# Patient Record
Sex: MALE | Race: OTHER | HISPANIC OR LATINO
[De-identification: names, ages, dates, MRNs, and addresses within clinical notes are randomized per-mention and may not be internally consistent; named-entity substitution may affect disease eponyms.]

---

## 2021-10-28 ENCOUNTER — APPOINTMENT (OUTPATIENT)
Dept: UROLOGY | Facility: CLINIC | Age: 56
End: 2021-10-28
Payer: COMMERCIAL

## 2021-10-28 VITALS
TEMPERATURE: 97.8 F | BODY MASS INDEX: 28.85 KG/M2 | HEART RATE: 98 BPM | HEIGHT: 64 IN | WEIGHT: 169 LBS | RESPIRATION RATE: 15 BRPM | DIASTOLIC BLOOD PRESSURE: 78 MMHG | SYSTOLIC BLOOD PRESSURE: 115 MMHG | OXYGEN SATURATION: 96 %

## 2021-10-28 DIAGNOSIS — R35.1 NOCTURIA: ICD-10-CM

## 2021-10-28 PROCEDURE — 99203 OFFICE O/P NEW LOW 30 MIN: CPT

## 2021-10-28 NOTE — ASSESSMENT
[FreeTextEntry1] : Nocturia may have improved.  He does not have significant daytime symptoms.  Prostate is small on examination and the residual urine volume is minimal.  Urine studies will be sent.  He will continue to manage conservatively and let me know if symptoms recur or worsen.  We will try to obtain his laboratory results from his annual physical.\par \par Addison Britt MD, FACS\par [facility]\par  of Urology\par \par [address], [address]\[place] [address]\par \par [address], [address]\[place] [address]\par \par Tel: [phone]\par Fax: [phone]

## 2021-10-28 NOTE — PHYSICAL EXAM
[General Appearance - Well Developed] : well developed [General Appearance - Well Nourished] : well nourished [Normal Appearance] : normal appearance [Well Groomed] : well groomed [General Appearance - In No Acute Distress] : no acute distress [Edema] : no peripheral edema [Respiration, Rhythm And Depth] : normal respiratory rhythm and effort [Exaggerated Use Of Accessory Muscles For Inspiration] : no accessory muscle use [Abdomen Soft] : soft [Abdomen Tenderness] : non-tender [Costovertebral Angle Tenderness] : no ~M costovertebral angle tenderness [Urethral Meatus] : meatus normal [Penis Abnormality] : normal circumcised penis [Urinary Bladder Findings] : the bladder was normal on palpation [Scrotum] : the scrotum was normal [Testes Tenderness] : no tenderness of the testes [Testes Mass (___cm)] : there were no testicular masses [Prostate Enlargement] : the prostate was not enlarged [Prostate Tenderness] : the prostate was not tender [No Prostate Nodules] : no prostate nodules [Normal Station and Gait] : the gait and station were normal for the patient's age [] : no rash [No Focal Deficits] : no focal deficits [Affect] : the affect was normal [Oriented To Time, Place, And Person] : oriented to person, place, and time [Mood] : the mood was normal [Not Anxious] : not anxious [Inguinal Lymph Nodes Enlarged Bilaterally] : inguinal

## 2021-10-28 NOTE — HISTORY OF PRESENT ILLNESS
[FreeTextEntry1] : He is a 56-year-old man who is seen today for initial visit.  In the last few weeks, he was having nocturia up to 3 times.  Generally he does not have significant urinary symptoms.  He does not have urinary frequency during the day.  Also recently he switched his work schedule from daytime to nighttime.  He believes that urination has improved but has not had a chance to assess it yet because now he sleeps in the daytime when he gets home.  Residual urine volume was less than 50 cc.  There is no hematuria or dysuria.  He also was experiencing rectal pain and supposed to see gastroenterology soon.  Rectal pain has improved. detailed exam

## 2021-10-28 NOTE — LETTER BODY
[Dear  ___] : Dear  [unfilled], [Consult Letter:] : I had the pleasure of evaluating your patient, [unfilled]. [Consult Closing:] : Thank you very much for allowing me to participate in the care of this patient.  If you have any questions, please do not hesitate to contact me. [FreeTextEntry1] : [address]\[place] [address]\par [phone]

## 2021-10-29 LAB
APPEARANCE: CLEAR
BACTERIA: NEGATIVE
BILIRUBIN URINE: NEGATIVE
BLOOD URINE: NEGATIVE
COLOR: YELLOW
GLUCOSE QUALITATIVE U: NEGATIVE
HYALINE CASTS: 1 /LPF
KETONES URINE: NEGATIVE
LEUKOCYTE ESTERASE URINE: NEGATIVE
MICROSCOPIC-UA: NORMAL
NITRITE URINE: NEGATIVE
PH URINE: 5.5
PROTEIN URINE: NORMAL
RED BLOOD CELLS URINE: 1 /HPF
SPECIFIC GRAVITY URINE: 1.03
SQUAMOUS EPITHELIAL CELLS: 0 /HPF
UROBILINOGEN URINE: NORMAL
WHITE BLOOD CELLS URINE: 1 /HPF

## 2021-10-31 LAB — BACTERIA UR CULT: NORMAL

## 2023-07-06 ENCOUNTER — APPOINTMENT (OUTPATIENT)
Dept: ORTHOPEDIC SURGERY | Facility: CLINIC | Age: 58
End: 2023-07-06
Payer: COMMERCIAL

## 2023-07-06 VITALS — HEIGHT: 64 IN | WEIGHT: 169 LBS | BODY MASS INDEX: 28.85 KG/M2

## 2023-07-06 PROCEDURE — J3490M: CUSTOM

## 2023-07-06 PROCEDURE — 20526 THER INJECTION CARP TUNNEL: CPT | Mod: RT

## 2023-07-06 PROCEDURE — 99203 OFFICE O/P NEW LOW 30 MIN: CPT | Mod: 25

## 2023-07-06 NOTE — ASSESSMENT
[FreeTextEntry1] : R Carpal tunnel injection was performed because of pain inflammation\par Anesthesia: ethyl chloride sprayed topically\par Celestone 6mg: An injection of Celestone 1cc\par Lidocaine: An injection of Lidocaine 1% 1cc\par Marcaine: An injection of Marcaine 0.5% 1cc\par \par Patient has tried OTC's including aspirin, Ibuprofen, Aleve etc or prescription NSAIDS, and/or exercises at home and/ or\par physical therapy without satisfactory response.\par After verbal consent using sterile preparation and technique. The risks, benefits, and alternatives to cortisone injection\par were explained in full to the patient. Risks outlined include but are not limited to infection, sepsis, bleeding, scarring, skin\par discoloration, temporary increase in pain, syncopal episode, failure to resolve symptoms, allergic reaction, symptom\par recurrence, and elevation of blood sugar in diabetics. Patient understood the risks. All questions were answered. After\par discussion of options, patient requested an injection. Oral informed consent was obtained and sterile prep was done of the\par injection site. Sterile technique was utilized for the procedure including the preparation of the solutions used for the\par injection. Patient tolerated the procedure well. Advised to ice the injection site this evening.\par Prep with betadine locally to site. Sterile technique used

## 2023-07-06 NOTE — PHYSICAL EXAM
[de-identified] : R hand: \par Tender volar wrist \par Good finger ROM \par +Tinels \par +Phalens \par +Compression test \par Decreased sensation median nerve distribution\par \par L hand: \par Tender volar wrist \par Good finger ROM \par +Tinels \par +Phalens \par +Compression test \par Decreased sensation median nerve distribution\par \par

## 2023-07-06 NOTE — HISTORY OF PRESENT ILLNESS
[8] : 8 [6] : 6 [Dull/Aching] : dull/aching [Tingling] : tingling [Nothing helps with pain getting better] : Nothing helps with pain getting better [de-identified] : R worse than L hand pain, numbness\par  [] : no [FreeTextEntry1] :  hands/ wrists  [FreeTextEntry3] : a month ago  [FreeTextEntry5] : he has pain and numbness, no injury  [de-identified] : activity

## 2023-07-12 ENCOUNTER — APPOINTMENT (OUTPATIENT)
Dept: NEUROLOGY | Facility: CLINIC | Age: 58
End: 2023-07-12
Payer: COMMERCIAL

## 2023-07-12 DIAGNOSIS — R20.0 ANESTHESIA OF SKIN: ICD-10-CM

## 2023-07-12 PROCEDURE — 95911 NRV CNDJ TEST 9-10 STUDIES: CPT

## 2023-07-12 PROCEDURE — 95886 MUSC TEST DONE W/N TEST COMP: CPT

## 2023-07-17 ENCOUNTER — APPOINTMENT (OUTPATIENT)
Dept: ORTHOPEDIC SURGERY | Facility: CLINIC | Age: 58
End: 2023-07-17
Payer: COMMERCIAL

## 2023-07-17 VITALS — WEIGHT: 169 LBS | HEIGHT: 64 IN | BODY MASS INDEX: 28.85 KG/M2

## 2023-07-17 PROCEDURE — 99213 OFFICE O/P EST LOW 20 MIN: CPT

## 2023-07-17 NOTE — HISTORY OF PRESENT ILLNESS
[1] : 2 [0] : 0 [de-identified] : EMG shows mild bilateral CTS\par \par \par Injection helped [FreeTextEntry1] : RT hand [FreeTextEntry5] : Pain is better [de-identified] : Injection

## 2023-07-17 NOTE — PHYSICAL EXAM
[de-identified] : R hand: \par Tender volar wrist \par Good finger ROM \par +Tinels \par +Phalens \par +Compression test \par Decreased sensation median nerve distribution\par \par L hand: \par Tender volar wrist \par Good finger ROM \par +Tinels \par +Phalens \par +Compression test \par Decreased sensation median nerve distribution\par \par

## 2024-02-05 ENCOUNTER — APPOINTMENT (OUTPATIENT)
Dept: ORTHOPEDIC SURGERY | Facility: CLINIC | Age: 59
End: 2024-02-05
Payer: COMMERCIAL

## 2024-02-05 VITALS — BODY MASS INDEX: 28.85 KG/M2 | HEIGHT: 64 IN | WEIGHT: 169 LBS

## 2024-02-05 DIAGNOSIS — G56.02 CARPAL TUNNEL SYNDROME, LEFT UPPER LIMB: ICD-10-CM

## 2024-02-05 PROCEDURE — 99214 OFFICE O/P EST MOD 30 MIN: CPT | Mod: 57

## 2024-02-05 RX ORDER — METHYLPREDNISOLONE 4 MG/1
4 TABLET ORAL
Qty: 1 | Refills: 0 | Status: ACTIVE | COMMUNITY
Start: 2024-02-05 | End: 1900-01-01

## 2024-02-05 NOTE — HISTORY OF PRESENT ILLNESS
[de-identified] : EMG shows mild bilateral CTS   Injection helped until recnetly [4] : 4 [FreeTextEntry1] : RT hand [FreeTextEntry5] : 02/05/2024 pt states he still have pain  [de-identified] : Injection

## 2024-02-05 NOTE — PHYSICAL EXAM
[de-identified] : R hand: \par  Tender volar wrist \par  Good finger ROM \par  +Tinels \par  +Phalens \par  +Compression test \par  Decreased sensation median nerve distribution\par  \par  L hand: \par  Tender volar wrist \par  Good finger ROM \par  +Tinels \par  +Phalens \par  +Compression test \par  Decreased sensation median nerve distribution\par  \par

## 2024-02-05 NOTE — ASSESSMENT
[FreeTextEntry1] : R then L CTR R/B/A of surgery discussed with the patient. Risks including but not limited to infection, nerve damage, tendon damage, pain, stiffness, recurrence, no resolution of symptoms, loss of function, limb or life. They understand and agree to surgery  Return post op

## 2024-05-06 RX ORDER — HYDROCODONE BITARTRATE AND ACETAMINOPHEN 5; 325 MG/1; MG/1
5-325 TABLET ORAL
Qty: 10 | Refills: 0 | Status: ACTIVE | COMMUNITY
Start: 2024-05-06 | End: 1900-01-01

## 2024-05-08 ENCOUNTER — APPOINTMENT (OUTPATIENT)
Dept: ORTHOPEDIC SURGERY | Facility: AMBULATORY SURGERY CENTER | Age: 59
End: 2024-05-08
Payer: COMMERCIAL

## 2024-05-08 PROCEDURE — 64721 CARPAL TUNNEL SURGERY: CPT | Mod: RT

## 2024-05-10 ENCOUNTER — NON-APPOINTMENT (OUTPATIENT)
Age: 59
End: 2024-05-10

## 2024-05-16 ENCOUNTER — APPOINTMENT (OUTPATIENT)
Dept: ORTHOPEDIC SURGERY | Facility: CLINIC | Age: 59
End: 2024-05-16
Payer: COMMERCIAL

## 2024-05-16 VITALS — BODY MASS INDEX: 28.85 KG/M2 | HEIGHT: 64 IN | WEIGHT: 169 LBS

## 2024-05-16 PROCEDURE — 99024 POSTOP FOLLOW-UP VISIT: CPT

## 2024-05-16 NOTE — HISTORY OF PRESENT ILLNESS
[2] : 2 [Dull/Aching] : dull/aching [] : Post Surgical Visit: yes [de-identified] : R CTR last week Improved sensaiton  [FreeTextEntry1] : R wrist [de-identified] : 5/8/24 [de-identified] : R DAV

## 2024-05-16 NOTE — PHYSICAL EXAM
States that she has been feeling congested took covid tests at home and were negative    [de-identified] : Mild hand swelling Healed incision No evidence of infection Mild tenderness at the surgical site Good finger ROM Improved sensation    No

## 2024-05-21 ENCOUNTER — APPOINTMENT (OUTPATIENT)
Dept: ORTHOPEDIC SURGERY | Facility: CLINIC | Age: 59
End: 2024-05-21
Payer: COMMERCIAL

## 2024-05-21 ENCOUNTER — NON-APPOINTMENT (OUTPATIENT)
Age: 59
End: 2024-05-21

## 2024-05-21 VITALS — BODY MASS INDEX: 28.85 KG/M2 | WEIGHT: 169 LBS | HEIGHT: 64 IN

## 2024-05-21 PROCEDURE — 99024 POSTOP FOLLOW-UP VISIT: CPT

## 2024-05-21 NOTE — HISTORY OF PRESENT ILLNESS
[2] : 2 [Dull/Aching] : dull/aching [] : Post Surgical Visit: yes [de-identified] : R CTR 5/8/24 He has pain   [8] : 8 [Tingling] : tingling [FreeTextEntry1] : R wrist [de-identified] : none [de-identified] : 5/8/24 [de-identified] : R DAV

## 2024-05-21 NOTE — PHYSICAL EXAM
[de-identified] : R hand  Min swelling  Tender palm Good ROM No erythema Incision healed Senstion improved

## 2024-06-06 ENCOUNTER — NON-APPOINTMENT (OUTPATIENT)
Age: 59
End: 2024-06-06

## 2024-06-06 ENCOUNTER — APPOINTMENT (OUTPATIENT)
Dept: ORTHOPEDIC SURGERY | Facility: CLINIC | Age: 59
End: 2024-06-06
Payer: COMMERCIAL

## 2024-06-06 VITALS — BODY MASS INDEX: 28.85 KG/M2 | HEIGHT: 64 IN | WEIGHT: 169 LBS

## 2024-06-06 PROCEDURE — 99024 POSTOP FOLLOW-UP VISIT: CPT

## 2024-06-06 NOTE — HISTORY OF PRESENT ILLNESS
[6] : 6 [2] : 2 [Dull/Aching] : dull/aching [Tingling] : tingling [] : Post Surgical Visit: yes [de-identified] : R CTR 3 weeks Numbness is much better Scar is tender   [FreeTextEntry1] : R wrist [de-identified] : WB THERAPY [de-identified] : 5/8/24 [de-identified] : R DAV

## 2024-06-06 NOTE — ASSESSMENT
[FreeTextEntry1] : Scar massage THerapy No work- total disability FOllow up in 2-3 weeks- discuss rtw

## 2024-06-27 ENCOUNTER — APPOINTMENT (OUTPATIENT)
Dept: ORTHOPEDIC SURGERY | Facility: CLINIC | Age: 59
End: 2024-06-27
Payer: COMMERCIAL

## 2024-06-27 VITALS — BODY MASS INDEX: 28.85 KG/M2 | HEIGHT: 64 IN | WEIGHT: 169 LBS

## 2024-06-27 DIAGNOSIS — G56.01 CARPAL TUNNEL SYNDROME, RIGHT UPPER LIMB: ICD-10-CM

## 2024-06-27 PROCEDURE — 99024 POSTOP FOLLOW-UP VISIT: CPT

## 2024-07-15 ENCOUNTER — APPOINTMENT (OUTPATIENT)
Dept: ORTHOPEDIC SURGERY | Facility: CLINIC | Age: 59
End: 2024-07-15
Payer: COMMERCIAL

## 2024-07-15 VITALS — HEIGHT: 64 IN | BODY MASS INDEX: 28.85 KG/M2 | WEIGHT: 169 LBS

## 2024-07-15 DIAGNOSIS — G56.01 CARPAL TUNNEL SYNDROME, RIGHT UPPER LIMB: ICD-10-CM

## 2024-07-15 PROCEDURE — 99024 POSTOP FOLLOW-UP VISIT: CPT

## 2024-07-15 RX ORDER — MELOXICAM 15 MG/1
15 TABLET ORAL
Qty: 30 | Refills: 0 | Status: ACTIVE | COMMUNITY
Start: 2024-07-15 | End: 1900-01-01

## 2024-07-23 ENCOUNTER — APPOINTMENT (OUTPATIENT)
Dept: NEUROLOGY | Facility: CLINIC | Age: 59
End: 2024-07-23
Payer: COMMERCIAL

## 2024-07-23 PROCEDURE — 95886 MUSC TEST DONE W/N TEST COMP: CPT

## 2024-07-23 PROCEDURE — 95911 NRV CNDJ TEST 9-10 STUDIES: CPT

## 2024-07-29 ENCOUNTER — APPOINTMENT (OUTPATIENT)
Dept: ORTHOPEDIC SURGERY | Facility: CLINIC | Age: 59
End: 2024-07-29
Payer: COMMERCIAL

## 2024-07-29 VITALS — BODY MASS INDEX: 28.85 KG/M2 | WEIGHT: 169 LBS | HEIGHT: 64 IN

## 2024-07-29 DIAGNOSIS — G56.01 CARPAL TUNNEL SYNDROME, RIGHT UPPER LIMB: ICD-10-CM

## 2024-07-29 PROCEDURE — J3490M: CUSTOM

## 2024-07-29 PROCEDURE — 20526 THER INJECTION CARP TUNNEL: CPT | Mod: RT

## 2024-07-29 PROCEDURE — 99024 POSTOP FOLLOW-UP VISIT: CPT

## 2024-07-29 NOTE — ASSESSMENT
[FreeTextEntry1] : Right carpal tunnel injection was performed because of numbness and tingling Anesthesia: ethyl chloride sprayed topically Celestone 6mg: An injection of Celestone 1cc Lidocaine: An injection of Lidocaine 1% 1cc Marcaine: An injection of Marcaine 0.5% 1cc   The risks, benefits, and alternatives to cortisone injection were explained in full to the patient. Risks outlined include but are not limited to infection, sepsis, bleeding, scarring, skin discoloration, temporary increase in pain, syncopal episode, failure to resolve symptoms, allergic reaction, symptom recurrence, and elevation of blood sugar in diabetics. Patient understood the risks. All questions were answered. After discussion of options, patient verbally consented to an injection. Sterile prep was done of the injection site. Patient tolerated the procedure well. Advised to ice the injection site this evening.   Activity modification as tolerated Ice as needed NSAIDs as needed Return in 3 weeks

## 2024-07-29 NOTE — HISTORY OF PRESENT ILLNESS
[6] : 6 [2] : 2 [Dull/Aching] : dull/aching [Tingling] : tingling [Not working due to injury] : Work status: not working due to injury [] : Post Surgical Visit: yes [de-identified] : I independently reviewed the EMG and my interpretation is there is slowing of latencies R CT- no improvement from pre op [FreeTextEntry1] : R wrist [de-identified] : WB THERAPY [de-identified] : 5/8/24 [de-identified] : R DAV

## 2024-08-13 ENCOUNTER — APPOINTMENT (OUTPATIENT)
Dept: ORTHOPEDIC SURGERY | Facility: CLINIC | Age: 59
End: 2024-08-13
Payer: COMMERCIAL

## 2024-08-13 ENCOUNTER — NON-APPOINTMENT (OUTPATIENT)
Age: 59
End: 2024-08-13

## 2024-08-13 VITALS — HEIGHT: 64 IN | BODY MASS INDEX: 28.85 KG/M2 | WEIGHT: 169 LBS

## 2024-08-13 DIAGNOSIS — G56.01 CARPAL TUNNEL SYNDROME, RIGHT UPPER LIMB: ICD-10-CM

## 2024-08-13 DIAGNOSIS — G56.02 CARPAL TUNNEL SYNDROME, LEFT UPPER LIMB: ICD-10-CM

## 2024-08-13 PROCEDURE — 99212 OFFICE O/P EST SF 10 MIN: CPT

## 2024-08-13 RX ORDER — MELOXICAM 15 MG/1
15 TABLET ORAL
Qty: 30 | Refills: 0 | Status: ACTIVE | COMMUNITY
Start: 2024-08-13 | End: 1900-01-01

## 2024-08-13 NOTE — ASSESSMENT
[FreeTextEntry1] : I am prescribing Mobic 15mg Daily to alleviate pain and inflammation. Patient advised to take for 1-2 weeks. They were advised of risks of medication including but not limited GI upset and high blood pressure.  Rx Mobic He will return to work 8/19/24 without restrictions Follow up in 4 weeks

## 2024-08-13 NOTE — HISTORY OF PRESENT ILLNESS
[6] : 6 [2] : 2 [Dull/Aching] : dull/aching [Tingling] : tingling [Not working due to injury] : Work status: not working due to injury [] : Post Surgical Visit: yes [de-identified] : R CTR  Injection last visit helped  [de-identified] : WB THERAPY [FreeTextEntry1] : R wrist [de-identified] : 5/8/24 [de-identified] : R DAV

## 2024-09-23 ENCOUNTER — APPOINTMENT (OUTPATIENT)
Dept: ORTHOPEDIC SURGERY | Facility: CLINIC | Age: 59
End: 2024-09-23
Payer: COMMERCIAL

## 2024-09-23 VITALS — WEIGHT: 169 LBS | HEIGHT: 64 IN | BODY MASS INDEX: 28.85 KG/M2

## 2024-09-23 DIAGNOSIS — G56.01 CARPAL TUNNEL SYNDROME, RIGHT UPPER LIMB: ICD-10-CM

## 2024-09-23 PROCEDURE — 99213 OFFICE O/P EST LOW 20 MIN: CPT

## 2024-09-23 NOTE — HISTORY OF PRESENT ILLNESS
[Dull/Aching] : dull/aching [de-identified] : R CTR Injection helped, but now wearing off RF worse than others [6] : 6 [2] : 2 [FreeTextEntry1] : R wrist [de-identified] : mobic

## 2024-10-21 ENCOUNTER — APPOINTMENT (OUTPATIENT)
Dept: ORTHOPEDIC SURGERY | Facility: CLINIC | Age: 59
End: 2024-10-21